# Patient Record
Sex: MALE | Race: WHITE
[De-identification: names, ages, dates, MRNs, and addresses within clinical notes are randomized per-mention and may not be internally consistent; named-entity substitution may affect disease eponyms.]

---

## 2019-08-11 ENCOUNTER — HOSPITAL ENCOUNTER (OUTPATIENT)
Dept: HOSPITAL 76 - ED | Age: 24
Setting detail: OBSERVATION
LOS: 1 days | Discharge: PSYCHIATRIC OR SUBSTANCE USE FACILITY | End: 2019-08-12
Attending: SPECIALIST | Admitting: INTERNAL MEDICINE
Payer: COMMERCIAL

## 2019-08-11 DIAGNOSIS — R73.9: ICD-10-CM

## 2019-08-11 DIAGNOSIS — F17.200: ICD-10-CM

## 2019-08-11 DIAGNOSIS — F32.9: ICD-10-CM

## 2019-08-11 DIAGNOSIS — T43.222A: Primary | ICD-10-CM

## 2019-08-11 DIAGNOSIS — H57.04: ICD-10-CM

## 2019-08-11 DIAGNOSIS — R61: ICD-10-CM

## 2019-08-11 DIAGNOSIS — R00.0: ICD-10-CM

## 2019-08-11 DIAGNOSIS — D72.829: ICD-10-CM

## 2019-08-11 LAB
AMPHET UR QL SCN: NEGATIVE
ANION GAP SERPL CALCULATED.4IONS-SCNC: 12 MMOL/L (ref 6–13)
APAP SERPL-MCNC: < 10 UG/ML (ref 10–30)
BASOPHILS NFR BLD AUTO: 0.1 10^3/UL (ref 0–0.1)
BASOPHILS NFR BLD AUTO: 0.5 %
BENZODIAZ UR QL SCN: NEGATIVE
BUN SERPL-MCNC: 8 MG/DL (ref 6–20)
CALCIUM UR-MCNC: 9.1 MG/DL (ref 8.5–10.3)
CHLORIDE SERPL-SCNC: 106 MMOL/L (ref 101–111)
CO2 SERPL-SCNC: 20 MMOL/L (ref 21–32)
COCAINE UR-SCNC: NEGATIVE UMOL/L
CREAT SERPLBLD-SCNC: 0.8 MG/DL (ref 0.6–1.2)
EOSINOPHIL # BLD AUTO: 0 10^3/UL (ref 0–0.7)
EOSINOPHIL NFR BLD AUTO: 0.1 %
ERYTHROCYTE [DISTWIDTH] IN BLOOD BY AUTOMATED COUNT: 12.8 % (ref 12–15)
GFRSERPLBLD MDRD-ARVRAT: 118 ML/MIN/{1.73_M2} (ref 89–?)
GLUCOSE SERPL-MCNC: 121 MG/DL (ref 70–100)
HGB UR QL STRIP: 15.7 G/DL (ref 14–18)
LYMPHOCYTES # SPEC AUTO: 1.5 10^3/UL (ref 1.5–3.5)
LYMPHOCYTES NFR BLD AUTO: 11.8 %
MCH RBC QN AUTO: 30 PG (ref 27–31)
MCHC RBC AUTO-ENTMCNC: 33.5 G/DL (ref 32–36)
MCV RBC AUTO: 89.3 FL (ref 80–94)
METHADONE UR QL SCN: NEGATIVE
METHAMPHET UR QL SCN: NEGATIVE
MONOCYTES # BLD AUTO: 0.5 10^3/UL (ref 0–1)
MONOCYTES NFR BLD AUTO: 3.5 %
NEUTROPHILS # BLD AUTO: 10.9 10^3/UL (ref 1.5–6.6)
NEUTROPHILS # SNV AUTO: 13 X10^3/UL (ref 4.8–10.8)
NEUTROPHILS NFR BLD AUTO: 83.8 %
OPIATES UR QL SCN: NEGATIVE
PDW BLD AUTO: 9.6 FL (ref 7.4–11.4)
PLATELET # BLD: 285 10^3/UL (ref 130–450)
RBC MAR: 5.24 10^6/UL (ref 4.7–6.1)
SALICYLATES SERPL-MCNC: < 6 MG/DL
SODIUM SERPLBLD-SCNC: 138 MMOL/L (ref 135–145)
VOLATILE DRUGS POS SERPL SCN: (no result)

## 2019-08-11 PROCEDURE — 80048 BASIC METABOLIC PNL TOTAL CA: CPT

## 2019-08-11 PROCEDURE — 80329 ANALGESICS NON-OPIOID 1 OR 2: CPT

## 2019-08-11 PROCEDURE — 80320 DRUG SCREEN QUANTALCOHOLS: CPT

## 2019-08-11 PROCEDURE — 96361 HYDRATE IV INFUSION ADD-ON: CPT

## 2019-08-11 PROCEDURE — 96360 HYDRATION IV INFUSION INIT: CPT

## 2019-08-11 PROCEDURE — 93005 ELECTROCARDIOGRAM TRACING: CPT

## 2019-08-11 PROCEDURE — 36415 COLL VENOUS BLD VENIPUNCTURE: CPT

## 2019-08-11 PROCEDURE — 85025 COMPLETE CBC W/AUTO DIFF WBC: CPT

## 2019-08-11 PROCEDURE — 80307 DRUG TEST PRSMV CHEM ANLYZR: CPT

## 2019-08-11 PROCEDURE — 99285 EMERGENCY DEPT VISIT HI MDM: CPT

## 2019-08-11 PROCEDURE — 80306 DRUG TEST PRSMV INSTRMNT: CPT

## 2019-08-11 NOTE — ED PHYSICIAN DOCUMENTATION
PD HPI OVERDOSE





- Stated complaint


Stated Complaint: SI





- Chief complaint


Chief Complaint: MHE





- History obtained from


History obtained from: Patient





- History of Present Illness


Timing - onset: How many hours ago (3.5)


Subtance(s) ingested: Single


Associated symptoms: Diaphoresis.  No: Cardiac arrest, Resp depression, Altered 

mental status, Agitated, Combative, Hallucinations, Abdominal pain, Dyspnea


Contributing factors: Depresssed, Suicidal


Recently seen: Not recently seen





- Additional information


Additional information: 





Is a 24-year-old man who attempted suicide tonight by ingesting 850 mg Zoloft 

tablets approximately 3-1/2 hours prior to presentation.  He has not vomited.  

He denies any recent physical illness including sore throat, coughing, fever, 

vomiting.  He is sweaty but denies chest pains or shortness of breath.  He was 

brought in by his wife arlyn and tells me he attempted to kill himself few 

years ago by hanging and was obviously unsuccessful.  He was admitted to a 

psychiatric facility at that time.  He is not currently seeing a psych counselor

or psychiatrist.  He is in the [employer].





Review of Systems


Constitutional: denies: Fever


Nose: denies: Congestion


Throat: denies: Sore throat


Cardiac: denies: Chest pain / pressure, Palpitations


Respiratory: denies: Dyspnea, Cough


GI: denies: Abdominal Pain, Nausea, Vomiting


: denies: Dysuria


Skin: denies: Rash


Neurologic: denies: Generalized weakness, Focal weakness, Seizure


Psychiatric: reports: Depressed, Suicidal


Endocrine: reports: Other (He is not diabetic.)





PD PAST MEDICAL HISTORY





- Past Medical History


Past Medical History: Yes


Psych: Depression


Other Past Medical History: SA & SI





- Past Surgical History


Past Surgical History: Yes





- Present Medications


Home Medications: 


                                Ambulatory Orders











 Medication  Instructions  Recorded  Confirmed


 


Sertraline [Zoloft] 50 mg PO DAILY 08/11/19 08/11/19














- Allergies


Allergies/Adverse Reactions: 


                                    Allergies











Allergy/AdvReac Type Severity Reaction Status Date / Time


 


No Known Drug Allergies Allergy   Verified 08/11/19 23:09














- Social History


Does the pt smoke?: Yes


Smoking Status: Current every day smoker


Does the pt drink ETOH?: No


Does the pt have substance abuse?: No





- Immunizations


Immunizations are current?: Yes





- POLST


Patient has POLST: No





PD ED PE NORMAL





- Vitals


Vital signs reviewed: Yes





- General


General: Alert and oriented X 3, No acute distress, Well developed/nourished, 

Other (Patient is diaphoretic)





- HEENT


HEENT: Atraumatic, PERRL (Pupils are dilated but slowly reactive), Other (Mucous

 membranes are dry)





- Neck


Neck: No adenopathy, Thyroid normal





- Cardiac


Cardiac: RRR, No murmur, No rub, Strong equal pulses





- Respiratory


Respiratory: No respiratory distress, Clear bilaterally





- Abdomen


Abdomen: Normal bowel sounds, Soft, Non tender, Non distended, No organomegaly





- Derm


Derm: Normal color, Other (Diaphoretic)





- Extremities


Extremities: No deformity, No edema





- Neuro


Neuro: Alert and oriented X 3, CNs 2-12 intact, No motor deficit, No sensory 

deficit, Normal speech





- Psych


Psych: Other (Flat affect.  No apparent hallucinations.)





Results





- Vitals


Vitals: 


                               Vital Signs - 24 hr











  08/11/19 08/12/19 08/12/19





  23:00 00:06 00:27


 


Temperature 37.0 C  


 


Heart Rate 97 82 84


 


Respiratory 16 20 19





Rate   


 


Blood Pressure 149/87 H 142/84 H 137/82 H


 


O2 Saturation 99 96 96








                                     Oxygen











O2 Source                      Room air

















- EKG (time done)


  ** 2326


Rate: Rate (enter#)


Rhythm: NSR


Intervals: Normal ND


QRS: Normal


Ischemia: Normal ST segments


Compare to prior EKG: Old EKG unavailable





- Labs


Labs: 


                                Laboratory Tests











  08/11/19 08/11/19 08/11/19





  23:15 23:22 23:22


 


WBC   13.0 H 


 


RBC   5.24 


 


Hgb   15.7 


 


Hct   46.8 


 


MCV   89.3 


 


MCH   30.0 


 


MCHC   33.5 


 


RDW   12.8 


 


Plt Count   285 


 


MPV   9.6 


 


Neut # (Auto)   10.9 H 


 


Lymph # (Auto)   1.5 


 


Mono # (Auto)   0.5 


 


Eos # (Auto)   0.0 


 


Baso # (Auto)   0.1 


 


Absolute Nucleated RBC   0.00 


 


Nucleated RBC %   0.0 


 


Sodium    138


 


Potassium    3.8


 


Chloride    106


 


Carbon Dioxide    20 L


 


Anion Gap    12.0


 


BUN    8


 


Creatinine    0.8


 


Estimated GFR (MDRD)    118


 


Glucose    121 H


 


Calcium    9.1


 


Salicylates    < 6.0


 


Urine Opiates Screen  NEGATIVE  


 


Ur Oxycodone Screen  NEGATIVE  


 


Urine Methadone Screen  NEGATIVE  


 


Ur Propoxyphene Screen  NEGATIVE  


 


Acetaminophen    < 10 L


 


Ur Barbiturates Screen  NEGATIVE  


 


Ur Tricyclics Screen  NEGATIVE  


 


Ur Phencyclidine Scrn  NEGATIVE  


 


Ur Amphetamine Screen  NEGATIVE  


 


U Methamphetamines Scrn  NEGATIVE  


 


U Benzodiazepines Scrn  NEGATIVE  


 


Urine Cocaine Screen  NEGATIVE  


 


U Cannabinoids Screen  NEGATIVE  


 


Ethyl Alcohol    7.8














PD MEDICAL DECISION MAKING





- ED course


Complexity details: reviewed results, d/w patient, d/w family, d/w consultant


ED course: 





Patient's drug screen is negative.  Acetaminophen and salicylate levels are 

normal.  White blood cell count was minimally elevated at 13 glucose was a 

little elevated but otherwise labs are normal.  EKG did not show any conduction 

delay.  Patient was diaphoretic with dilated pupils I spoke to poison control 

they recommended observation for 24 hours and treatment with benzodiazepines if 

he develops any clonus, seizures or elevated heart rate.





Departure





- Departure


Disposition: ED Place in Observation


Clinical Impression: 


 Serotonin syndrome, Suicide attempt

## 2019-08-12 VITALS — SYSTOLIC BLOOD PRESSURE: 147 MMHG | DIASTOLIC BLOOD PRESSURE: 77 MMHG

## 2019-08-12 LAB
ANION GAP SERPL CALCULATED.4IONS-SCNC: 11 MMOL/L (ref 6–13)
BASOPHILS NFR BLD AUTO: 0.1 10^3/UL (ref 0–0.1)
BASOPHILS NFR BLD AUTO: 0.5 %
BUN SERPL-MCNC: 8 MG/DL (ref 6–20)
CALCIUM UR-MCNC: 9.1 MG/DL (ref 8.5–10.3)
CHLORIDE SERPL-SCNC: 108 MMOL/L (ref 101–111)
CO2 SERPL-SCNC: 23 MMOL/L (ref 21–32)
CREAT SERPLBLD-SCNC: 0.8 MG/DL (ref 0.6–1.2)
EOSINOPHIL # BLD AUTO: 0 10^3/UL (ref 0–0.7)
EOSINOPHIL NFR BLD AUTO: 0.1 %
ERYTHROCYTE [DISTWIDTH] IN BLOOD BY AUTOMATED COUNT: 12.8 % (ref 12–15)
GFRSERPLBLD MDRD-ARVRAT: 118 ML/MIN/{1.73_M2} (ref 89–?)
GLUCOSE SERPL-MCNC: 98 MG/DL (ref 70–100)
HGB UR QL STRIP: 14.7 G/DL (ref 14–18)
LYMPHOCYTES # SPEC AUTO: 2.2 10^3/UL (ref 1.5–3.5)
LYMPHOCYTES NFR BLD AUTO: 19.2 %
MCH RBC QN AUTO: 29.8 PG (ref 27–31)
MCHC RBC AUTO-ENTMCNC: 33.1 G/DL (ref 32–36)
MCV RBC AUTO: 89.9 FL (ref 80–94)
MONOCYTES # BLD AUTO: 0.7 10^3/UL (ref 0–1)
MONOCYTES NFR BLD AUTO: 6.3 %
NEUTROPHILS # BLD AUTO: 8.2 10^3/UL (ref 1.5–6.6)
NEUTROPHILS # SNV AUTO: 11.2 X10^3/UL (ref 4.8–10.8)
NEUTROPHILS NFR BLD AUTO: 73.5 %
PDW BLD AUTO: 9.9 FL (ref 7.4–11.4)
PLATELET # BLD: 272 10^3/UL (ref 130–450)
RBC MAR: 4.94 10^6/UL (ref 4.7–6.1)
SODIUM SERPLBLD-SCNC: 142 MMOL/L (ref 135–145)

## 2019-08-12 RX ADMIN — SODIUM CHLORIDE, PRESERVATIVE FREE SCH: 5 INJECTION INTRAVENOUS at 17:06

## 2019-08-12 RX ADMIN — SODIUM CHLORIDE SCH MLS/HR: 9 INJECTION, SOLUTION INTRAVENOUS at 03:38

## 2019-08-12 RX ADMIN — SODIUM CHLORIDE SCH MLS/HR: 9 INJECTION, SOLUTION INTRAVENOUS at 11:07

## 2019-08-12 RX ADMIN — SODIUM CHLORIDE, PRESERVATIVE FREE SCH: 5 INJECTION INTRAVENOUS at 11:06

## 2019-08-12 NOTE — DISCHARGE SUMMARY
Discharge Summary


Admit Date: 08/12/19


Discharge Date: 08/12/19


Discharging Provider: Toña Oh MD


Primary Care Provider: [facility]


Code Status: Attempt Resuscitation


Condition at Discharge: Fair


Discharge Disposition: [facility] DC/Xfer





- DIAGNOSES


Discharge Diagnoses with Status of Each Condition: 


Suicide attempt with zoloft





- HPI


History of Present Illness: 





Patient seen and examined on 08/12/19 at 3:00am.


Patient was brought to the ED by his wife after he reported intentionally taking

8 tablets of zoloft around 10 am on 8/11/19. He acknowledges it was with the 

intention of hurting himself. He has attempted hurting himself before (about 3 

yrs ago). He was admitted into a psych facility in [place]. He reused to 

discuss the specifics of the incident 3 years ago and refuses to discuss what is

going on in his life currently and contributing to the currently episode. He 

denies suicidal ideations currently. He works for the [employer]. He is very abrupt in

his responses , flat and seems shut off emotionally at the moment. His wife is 

at bedside and has the same demeanor.


He denies chest pain, ROSCOE, abd pain, n/v/d, fever or chills.


It was reported that his pupils were dilated at presentation, he was diaphoretic

and tachycardic. He currently appears calm. The rest of his history is 

unremarkable.








History





- Past Medical History


Psych: reports: Depression, Other (Suicidal Attempt)


MRSA Hx?: No


Other Past Medical History: SA & SI








- HOSPITAL COURSE


Hospital Course: 





he was observed for a few hours to watch for seizures and serotonin syndrome. 

Vitals remained stable. No sweats, nausea. Socail work evaluation done and [place] 

from [place] wants him to go to [facility]. 





- ALLERGIES


Allergies/Adverse Reactions: 


                                    Allergies











Allergy/AdvReac Type Severity Reaction Status Date / Time


 


No Known Drug Allergies Allergy   Verified 08/11/19 23:09














- PHYSICAL EXAM AT DISCHARGE


General Appearance: positive: No acute distress, Alert


Eyes Bilateral: positive: PERRL


ENT: positive: Pharynx nml


Neck: positive: No JVD


Respiratory: positive: Chest non-tender.  negative: Wheezes, Rales, Rhonchi


Cardiovascular: positive: Regular rate & rhythm.  negative: Systolic murmur, 

Gallop/S4, Friction rub


Peripheral Pulses: positive: 1+


Abdomen: positive: Non-tender, No organomegaly, Nml bowel sounds, No distention


Skin: positive: Warm, Dry.  negative: Diaphoresis


Extremities: positive: Full ROM, No pedal edema


Neurologic/Psychiatric: positive: Oriented x3, CN's nml (2-12), Motor nml, Sen

sation nml





- LABS


Result Diagrams: 


                                 08/12/19 04:43





                                 08/12/19 04:43





- TIME SPENT


Time Spent in Discharge (Minutes): 15

## 2019-08-12 NOTE — HISTORY & PHYSICAL EXAMINATION
Chief Complaint





- Chief Complaint


Chief Complaint: intentional drug overdose





History of Present Illness





- Admitted From


Admitted From:: [facility] ED





- History Obtained From


Records Reviewed: yes


History obtained from: patient and spouse





- History of Present Illness


HPI Comment/Other: 


Patient seen and examined on 08/12/19 at 3:00am.


Patient was brought to the ED by his wife after he reported intentionally taking

8 tablets of zoloft around 10 am on 8/11/19. He acknowledges it was with the 

intention of hurting himself. He has attempted hurting himself before (about 3 

yrs ago). He was admitted into a psych facility in [place]. He reused to 

discuss the specifics of the incident 3 years ago and refuses to discuss what is

going on in his life currently and contributing to the currently episode. He 

denies suicidal ideations currently. He works for the [employer]. He is very abrupt in

his responses , flat and seems shut off emotionally at the moment. His wife is 

at bedside and has the same demeanor.


He denies chest pain, ROSCOE, abd pain, n/v/d, fever or chills.


It was reported that his pupils were dilated at presentation, he was diaphoretic

and tachycardic. He currently appears calm. The rest of his history is 

unremarkable.








History





- Past Medical History


Psych: reports: Depression, Other (Suicidal Attempt)


MRSA Hx?: No


Other Past Medical History: SA & SI





- Family & Social History


Family History: Father: MI, Other family: Diabetes, Type 2 (maternal 

grandmother)


Social History Notes: Lives at home with his wife. He works for the [employer].  He 

denies alcohol, tobacco or illicit drug use





- POLST


Patient has POLST: No


POLST Status: Full Code





Meds/Allgy





- Home Medications


Home Medications: 


                                Ambulatory Orders











 Medication  Instructions  Recorded  Confirmed


 


Sertraline [Zoloft] 50 mg PO DAILY 08/11/19 08/11/19














- Allergies


Allergies/Adverse Reactions: 


                                    Allergies











Allergy/AdvReac Type Severity Reaction Status Date / Time


 


No Known Drug Allergies Allergy   Verified 08/11/19 23:09














Review of Systems





- Constitutional


Constitutional: reports: Diaphoresis.  denies: Fatigue, Fever, Chills, Weakness





- Eyes


Eyes: denies: Blurred vision, Vision loss, Dipolpia





- Ears, Nose & Throat


Ears, Nose & Throat: denies: Nasal pain, Sore throat, Hoarseness





- Cardiovascular


Cariovascular: denies: Chest pain, Edema, Syncope





- Respiratory


Respiratory: denies: Cough, Sputum production, Wheezing, SOB at rest, SOB with 

exertion





- Gastrointestinal


Gastrointestinal: denies: Abdominal pain, Abdominal distention, Constipation, 

Diarrhea, Nausea, Vomiting, Reflux/heartburn





- Genitourinary


Genitourinary: denies: Frequency, Urgency, Hematuria, Flank pain





- Musculoskeletal


Musculoskeletal: denies: Muscle pain, Back pain, Muscle aches, Gout





- Integumentary


Integumentary: denies: Rash, Pruritis





- Neurological


Neurological: denies: General weakness, Focal weakness, Headache, Dizziness, 

Numbness, Memory problems





- Psychiatric


Psychiatric: reports: Depression, Suicidal.  denies: Delusions, Hallucinations





- Endocrine


Endocrine: denies: Polyuria, Polydypsia





- Hematologic/Lymphatic


Hematologic/Lymphatic: denies: Anemia, Bruising, Blood clots


Prior Level of Functionality: 





Independent of activities of daily living


Lives at home with his wife. He works for the [employer].





Exam





- Vital Signs


Vital Signs: 





                                Vital Signs x48h











  Temp Pulse Resp BP Pulse Ox


 


 08/12/19 01:19   84  16  145/80 H  97


 


 08/12/19 00:27   84  19  137/82 H  96


 


 08/12/19 00:06   82  20  142/84 H  96


 


 08/11/19 23:00  37.0 C  97  16  149/87 H  99














- Physical Exam


General Appearance: positive: No acute distress, Alert.  negative: Lethargic


Eyes Bilateral: positive: EOMI


ENT: positive: ENT inspection nml, Pharynx nml


Neck: positive: Nml inspection, No JVD, Trachea midline


Respiratory: positive: Chest non-tender, No respiratory distress, Breath sounds 

nml.  negative: Wheezes, Rales, Rhonchi


Cardiovascular: positive: No murmur, No gallop, Tachycardia


Abdomen: positive: Non-tender, No organomegaly, Nml bowel sounds, No distention.

  negative: Guarding, Rebound


Back: positive: Nml inspection


Skin: positive: Color nml, No rash, Warm, Dry


Extremities: positive: Non-tender, Full ROM, Nml appearance, No pedal edema


Neurologic/Psychiatric: positive: Oriented x3, CN's nml (2-12), Motor nml, Other

 (depressed, suicidal)





Conclusion/Plan





- Problem List


(1) Suicide attempt


Conclusion/Plan: 


Patient took 8 tablets of zoloft


Concern for serotonin syndrome


Will need to monitor for at least 24 hours and treat symptoms as they arise


Hold all medications. IV hydration. 1:1 Observation


Social work consult in the am.








- Lab Results


Fish Bones: 


                                 08/11/19 23:22





                                 08/11/19 23:22





Core Measures





- Anticipated LOS


I expect patient to be DC'd or transferred within 96 hours.: Yes

## 2019-08-12 NOTE — DISCHARGE PLAN
Discharge Plan


Problem Reviewed?: Yes


Disposition: [facility] DC/Xfer


Condition: Fair


Diet: Regular


Activity Restrictions: Activity as Tolerated


Shower Restrictions: No


Driving Restrictions: No


Health Concerns: 


You were admitted to the emergency room because of an intentional overdose with 

Zoloft, 8 tablets.  Side effects of that could include seizures or serotonin 

syndrome. As such you were placed under observation for these side effects.








Plan of Treatment: 


Observation on telemetry. 


Care Goals: 


Transfer to [facility] per request of your CO.


Assessment: 


patient understands and agrees to care plan.


No Smoking: If you smoke, Please STOP!  Call [phone] for help.

## 2019-08-12 NOTE — PROVIDER PROGRESS NOTE
Subjective





- Prog Note Date


Prog Note Date: 08/12/19


Prog Note Time: 12:10





- Subjective


Pt reports feeling: Improved


Subjective: 





wife in bed with him, crying out and loud. She is crying. Has her head covered 

with blankete and won't come out. "i want to go home". RN explains she can go 

home, it's just her  that has to stay. She is crying, voice childlike, 

saying "I want him to go home with me."





He is tired but alert, no nausea, no diaphoresis. Denies cp, sob, does't have 

much appetite.





Current Medications





- Current Medications


Current Medications: 





Active Medications





Sodium Chloride (Normal Saline 0.9%)  1,000 mls @ 125 mls/hr IV .Q8H [facility]


   Last Admin: 08/12/19 11:07 Dose:  125 mls/hr


Polyethylene Glycol (Miralax)  17 gm PO DAILY [facility]


   Last Admin: 08/12/19 11:06 Dose:  Not Given


Sodium Chloride (Normal Saline Flush 0.9%)  10 ml IVP PRN PRN


   PRN Reason: AS NEEDED PER PROVIDER ORDERS


   Last Admin: 08/12/19 03:39 Dose:  10 ml


Sodium Chloride (Normal Saline Flush 0.9%)  10 ml IVP 0100,0900,1700 [facility]


   Last Admin: 08/12/19 11:06 Dose:  Not Given





                                        





Sertraline [Zoloft] 50 mg PO DAILY 08/11/19 











Objective





- Vital Signs/Intake & Output


Reviewed Vital Signs: Yes


Vital Signs: 


                                Vital Signs x48h











  Temp Pulse Resp BP Pulse Ox


 


 08/12/19 11:29  37.1 C  80  16  148/86 H  97


 


 08/12/19 07:41  36.8 C  73  16  133/65 H  94











Intake & Output: 


                                 Intake & Output











 08/09/19 08/10/19 08/11/19 08/12/19





 23:59 23:59 23:59 23:59


 


Intake Total    1160


 


Balance    1160














- Objective


General Appearance: positive: No acute distress, Alert


Eyes Bilateral: positive: PERRL, EOMI


ENT: positive: Pharynx nml


Neck: positive: No JVD


Respiratory: positive: No respiratory distress.  negative: Wheezes, Rales, 

Rhonchi


Cardiovascular: positive: Regular rate & rhythm.  negative: Systolic murmur, 

Gallop/S4, Friction rub


Abdomen: positive: Non-tender, No organomegaly, Nml bowel sounds, No distention


Skin: positive: Warm, Dry


Extremities: positive: Non-tender


Neurologic/Psychiatric: positive: Oriented x3, CN's nml (2-12), Motor nml, 

Sensation nml.  negative: Weakness, Facial droop, Slurred/abnml speech





- Lab Results


Fish Bones: 


                                 08/12/19 04:43





                                 08/12/19 04:43


Other Labs: 


                               Lab Results x24hrs











  08/12/19 08/12/19 08/11/19 Range/Units





  04:43 04:43 23:22 


 


WBC   11.2 H   (4.8-10.8)  x10^3/uL


 


RBC   4.94   (4.70-6.10)  10^6/uL


 


Hgb   14.7   (14.0-18.0)  g/dL


 


Hct   44.4   (42.0-52.0)  %


 


MCV   89.9   (80.0-94.0)  fL


 


MCH   29.8   (27.0-31.0)  pg


 


MCHC   33.1   (32.0-36.0)  g/dL


 


RDW   12.8   (12.0-15.0)  %


 


Plt Count   272   (130-450)  10^3/uL


 


MPV   9.9   (7.4-11.4)  fL


 


Neut # (Auto)   8.2 H   (1.5-6.6)  10^3/uL


 


Lymph # (Auto)   2.2   (1.5-3.5)  10^3/uL


 


Mono # (Auto)   0.7   (0.0-1.0)  10^3/uL


 


Eos # (Auto)   0.0   (0.0-0.7)  10^3/uL


 


Baso # (Auto)   0.1   (0.0-0.1)  10^3/uL


 


Absolute Nucleated RBC   0.00   x10^3/uL


 


Nucleated RBC %   0.0   /100WBC


 


Sodium  142   138  (135-145)  mmol/L


 


Potassium  3.8   3.8  (3.5-5.0)  mmol/L


 


Chloride  108   106  (101-111)  mmol/L


 


Carbon Dioxide  23   20 L  (21-32)  mmol/L


 


Anion Gap  11.0   12.0  (6-13)  


 


BUN  8   8  (6-20)  mg/dL


 


Creatinine  0.8   0.8  (0.6-1.2)  mg/dL


 


Estimated GFR (MDRD)  118   118  (>89)  


 


Glucose  98   121 H  ()  mg/dL


 


Calcium  9.1   9.1  (8.5-10.3)  mg/dL


 


Salicylates    < 6.0  mg/dL


 


Urine Opiates Screen     (NEGATIVE)  


 


Ur Oxycodone Screen     (NEGATIVE)  


 


Urine Methadone Screen     (NEGATIVE)  


 


Ur Propoxyphene Screen     (NEGATIVE)  


 


Acetaminophen    < 10 L  (10-30)  ug/mL


 


Ur Barbiturates Screen     (NEGATIVE)  


 


Ur Tricyclics Screen     (NEGATIVE)  


 


Ur Phencyclidine Scrn     (NEGATIVE)  


 


Ur Amphetamine Screen     (NEGATIVE)  


 


U Methamphetamines Scrn     (NEGATIVE)  


 


U Benzodiazepines Scrn     (NEGATIVE)  


 


Urine Cocaine Screen     (NEGATIVE)  


 


U Cannabinoids Screen     (NEGATIVE)  


 


Ethyl Alcohol    7.8  mg/dL














  08/11/19 08/11/19 Range/Units





  23:22 23:15 


 


WBC  13.0 H   (4.8-10.8)  x10^3/uL


 


RBC  5.24   (4.70-6.10)  10^6/uL


 


Hgb  15.7   (14.0-18.0)  g/dL


 


Hct  46.8   (42.0-52.0)  %


 


MCV  89.3   (80.0-94.0)  fL


 


MCH  30.0   (27.0-31.0)  pg


 


MCHC  33.5   (32.0-36.0)  g/dL


 


RDW  12.8   (12.0-15.0)  %


 


Plt Count  285   (130-450)  10^3/uL


 


MPV  9.6   (7.4-11.4)  fL


 


Neut # (Auto)  10.9 H   (1.5-6.6)  10^3/uL


 


Lymph # (Auto)  1.5   (1.5-3.5)  10^3/uL


 


Mono # (Auto)  0.5   (0.0-1.0)  10^3/uL


 


Eos # (Auto)  0.0   (0.0-0.7)  10^3/uL


 


Baso # (Auto)  0.1   (0.0-0.1)  10^3/uL


 


Absolute Nucleated RBC  0.00   x10^3/uL


 


Nucleated RBC %  0.0   /100WBC


 


Sodium    (135-145)  mmol/L


 


Potassium    (3.5-5.0)  mmol/L


 


Chloride    (101-111)  mmol/L


 


Carbon Dioxide    (21-32)  mmol/L


 


Anion Gap    (6-13)  


 


BUN    (6-20)  mg/dL


 


Creatinine    (0.6-1.2)  mg/dL


 


Estimated GFR (MDRD)    (>89)  


 


Glucose    ()  mg/dL


 


Calcium    (8.5-10.3)  mg/dL


 


Salicylates    mg/dL


 


Urine Opiates Screen   NEGATIVE  (NEGATIVE)  


 


Ur Oxycodone Screen   NEGATIVE  (NEGATIVE)  


 


Urine Methadone Screen   NEGATIVE  (NEGATIVE)  


 


Ur Propoxyphene Screen   NEGATIVE  (NEGATIVE)  


 


Acetaminophen    (10-30)  ug/mL


 


Ur Barbiturates Screen   NEGATIVE  (NEGATIVE)  


 


Ur Tricyclics Screen   NEGATIVE  (NEGATIVE)  


 


Ur Phencyclidine Scrn   NEGATIVE  (NEGATIVE)  


 


Ur Amphetamine Screen   NEGATIVE  (NEGATIVE)  


 


U Methamphetamines Scrn   NEGATIVE  (NEGATIVE)  


 


U Benzodiazepines Scrn   NEGATIVE  (NEGATIVE)  


 


Urine Cocaine Screen   NEGATIVE  (NEGATIVE)  


 


U Cannabinoids Screen   NEGATIVE  (NEGATIVE)  


 


Ethyl Alcohol    mg/dL














ABX Reporting


Has patient been on IV antibiotics over the past 48 hours?: No





Assessment/Plan





- Problem List


(1) Suicide attempt


Impression: 


Medically stable for evaluation by CDP/Social work. If needed, stable for 

transfer





Patient took 8 tablets of zoloft


Concern for serotonin syndrome


Poison control said to monitor for at least 8 hours for seizures and treat 

serotonin symptoms as they arise


Hold all medications. IV hydration. 1:1 Observation


It has been the requisite time. 


Vitals stable.


No nausea, sweats, BP changes. No seizures.


Social work will see the patient this am.